# Patient Record
Sex: MALE | Race: BLACK OR AFRICAN AMERICAN | NOT HISPANIC OR LATINO | Employment: UNEMPLOYED
[De-identification: names, ages, dates, MRNs, and addresses within clinical notes are randomized per-mention and may not be internally consistent; named-entity substitution may affect disease eponyms.]

---

## 2023-08-23 ENCOUNTER — TRANSCRIBE ORDERS (OUTPATIENT)
Dept: OTHER | Age: 43
End: 2023-08-23

## 2023-08-23 DIAGNOSIS — D17.9 MULTIPLE LIPOMAS: Primary | ICD-10-CM

## 2023-08-29 ENCOUNTER — HOSPITAL ENCOUNTER (OUTPATIENT)
Facility: AMBULATORY SURGERY CENTER | Age: 43
End: 2023-08-29
Attending: SURGERY
Payer: COMMERCIAL

## 2023-08-29 ENCOUNTER — OFFICE VISIT (OUTPATIENT)
Dept: SURGERY | Facility: CLINIC | Age: 43
End: 2023-08-29
Attending: FAMILY MEDICINE
Payer: COMMERCIAL

## 2023-08-29 VITALS
DIASTOLIC BLOOD PRESSURE: 82 MMHG | SYSTOLIC BLOOD PRESSURE: 136 MMHG | WEIGHT: 288 LBS | BODY MASS INDEX: 35.07 KG/M2 | HEIGHT: 76 IN

## 2023-08-29 DIAGNOSIS — D17.9 MULTIPLE LIPOMAS: ICD-10-CM

## 2023-08-29 PROCEDURE — 99204 OFFICE O/P NEW MOD 45 MIN: CPT | Performed by: SURGERY

## 2023-08-29 RX ORDER — AMLODIPINE BESYLATE 5 MG/1
1 TABLET ORAL DAILY
COMMUNITY
Start: 2023-06-29 | End: 2024-06-28

## 2023-08-29 RX ORDER — WARFARIN SODIUM 5 MG/1
TABLET ORAL
COMMUNITY
Start: 2023-05-26

## 2023-08-29 NOTE — PROGRESS NOTES
General Surgery H&P  Soy Bejarano MRN# [id number]   Age/Sex: 42 year old male YOB: 1980     Reason for visit: Lipomas       Referring physician: Dr. Mendoza                    Assessment and Plan:   Assessment:  Bilateral upper extremity lipomatous  Currently on anticoagulation  Paraplegic wheelchair    42-year-old male presenting with multiple lipomas of the bilateral upper extremities.  We are in agreement to excise the 10 largest most bothersome of the lipomas.    Plan:  -Patient will require medical clearance for excision due to being on anticoagulation    -We will schedule the patient for bilateral upper extremity lipoma excisions.          Chief Complaint:     Chief Complaint   Patient presents with    lipomas     Multiple on both arms, possible surgery.Present  since the year 2000.        History is obtained from the patient    HPI:   Soy Bejarano is a 42 year old male who presents to the general surgery team today for evaluation regarding bilateral upper extremity lipomas.  Patient has had these chronically.  Patient continues to have the lipomas grow in size and cause the patient discomfort as well as pain when close to the nerve.  The patient has had lipoma excisions in the past.  He has no further complaints at this time.  Patient has a history of paraplegia and he is wheelchair-bound.  Patient does use anticoagulation.          Past Medical History:   No past medical history on file.           Past Surgical History:   No past surgical history on file.          Social History:    reports that he has quit smoking. He has never used smokeless tobacco.           Family History:   No family history on file.           Allergies:     Allergies   Allergen Reactions    Ibuprofen Hives    Penicillins               Medications:     Prior to Admission medications    Medication Sig Start Date End Date Taking? Authorizing Provider   amLODIPine (NORVASC) 5 MG tablet Take 1 tablet by mouth daily  "6/29/23 6/28/24 Yes Reported, Patient   citalopram (CELEXA) 40 MG tablet Take 40 mg by mouth daily.   Yes Reported, Patient   gabapentin (NEURONTIN) 600 MG tablet Take 600 mg by mouth 2 times daily.   Yes Reported, Patient   Nitrofurantoin Macrocrystal 100 MG CAPS Take 100 mg by mouth 2 times daily.   Yes Reported, Patient   tiZANidine (ZANAFLEX) 4 MG tablet Take 4 mg by mouth 3 times daily.   Yes Reported, Patient   warfarin (COUMADIN) 2.5 MG tablet Take 2.5 mg by mouth daily.   Yes Reported, Patient   warfarin ANTICOAGULANT (COUMADIN) 5 MG tablet  5/26/23  Yes Reported, Patient              Review of Systems:   A 12 point Review of Systems is negative other than noted in the HPI            Physical Exam:   Patient Vitals for the past 24 hrs:   BP Height Weight   08/29/23 1325 136/82 1.93 m (6' 4\") 130.6 kg (288 lb)        [unfilled]   Constitutional:   awake, alert, cooperative, no apparent distress, and appears stated age       Eyes:   PERRL, conjunctiva/corneas clear, EOM's intact; no scleral edema or icterus noted        ENT:   Normocephalic, without obvious abnormality, atraumatic, Lips, mucosa, and tongue normal        Hematologic / Lymphatic:   No lymphadenopathy       Lungs:   Normal respiratory effort, no accessory muscle use       Cardiovascular:   Regular rate and rhythm       Abdomen:   Soft, nondistended, nontender to palpation       Musculoskeletal:   No obvious swelling, bruising or deformity       Skin:   Skin color and texture normal for patient, multiple lipomas on the bilateral upper extremities.             Data:              DO Dayton Mercado DO  General Surgeon  [facility]  Surgery [address] [address]?  Office: [phone]  Employed by - [facility]  Pager: [phone]         "

## 2023-09-05 ENCOUNTER — TELEPHONE (OUTPATIENT)
Dept: SURGERY | Facility: CLINIC | Age: 43
End: 2023-09-05
Payer: COMMERCIAL

## 2023-09-05 NOTE — TELEPHONE ENCOUNTER
Pt's PCP called to ask Dr. Gonzalez his recommendations for coagulation for his upcoming multiple lipoma surgery on 9/14/23 and advice regarding bleeding risks. Pt is on Coumadin. PCP is asking if INR should be less than 3, if pt should hold-if so, how long, or if no change is needed in his therapy and can continue.     I will relay question to Dr. Gonzalez and call PCP back tomorrow.

## 2023-09-06 NOTE — TELEPHONE ENCOUNTER
I left a message with pt's PCP and PharmD, Bharti who manages pt's anticoagulation, with Dr. Gonzalez's recommendations for therapy and surgery. Provided direct number for her to return call if needed.

## 2023-09-09 ENCOUNTER — TRANSFERRED RECORDS (OUTPATIENT)
Dept: HEALTH INFORMATION MANAGEMENT | Facility: CLINIC | Age: 43
End: 2023-09-09

## 2023-09-11 NOTE — TELEPHONE ENCOUNTER
Spoke w/ patient regarding message below and surgical clearance.  Patient had already spoken with PCP about this and was aware of the situation.  At this time, he would like to clear up the concerns with his PCP before rescheduling or moving forward with procedure.  I have cancelled his surgical appt and post op at this time.  He will call back to gen surg number when he is ready to reschedule.

## 2023-09-11 NOTE — TELEPHONE ENCOUNTER
Bharti from [facility] stating patient was not cleared by his PCP for surgery that is schedule for 9/14 with Dr. Gonzalez. Patient's blood pressure has been elevated and as well as an abnormal EKG. Will let surgery schedulers know to reschedule the surgery to a later date.       [facility]      Monica Machado RN  [facility]  General Surgery  [address] [place] [address]  [email or web address]   Office:[phone]  Employed by [facility],

## 2023-09-13 ENCOUNTER — ANCILLARY ORDERS (OUTPATIENT)
Dept: CARDIOLOGY | Facility: CLINIC | Age: 43
End: 2023-09-13

## 2023-09-13 DIAGNOSIS — Z01.818 PREOPERATIVE GENERAL PHYSICAL EXAMINATION: Primary | ICD-10-CM

## 2023-09-13 DIAGNOSIS — R94.31 ABNORMAL EKG: ICD-10-CM

## 2023-09-20 ENCOUNTER — TRANSCRIBE ORDERS (OUTPATIENT)
Dept: OTHER | Age: 43
End: 2023-09-20

## 2023-09-20 DIAGNOSIS — Z01.818 PREOPERATIVE GENERAL PHYSICAL EXAMINATION: Primary | ICD-10-CM
